# Patient Record
Sex: FEMALE | Race: WHITE | NOT HISPANIC OR LATINO | RURAL
[De-identification: names, ages, dates, MRNs, and addresses within clinical notes are randomized per-mention and may not be internally consistent; named-entity substitution may affect disease eponyms.]

---

## 2017-06-26 ENCOUNTER — CONVERSION ENCOUNTER (OUTPATIENT)
Dept: FAMILY MEDICINE CLINIC | Facility: CLINIC | Age: 22
End: 2017-06-26

## 2017-06-29 ENCOUNTER — CONVERSION ENCOUNTER (OUTPATIENT)
Dept: FAMILY MEDICINE CLINIC | Facility: CLINIC | Age: 22
End: 2017-06-29

## 2017-06-29 LAB — BACTERIA UR CULT: ABNORMAL

## 2018-01-04 ENCOUNTER — CONVERSION ENCOUNTER (OUTPATIENT)
Dept: FAMILY MEDICINE CLINIC | Facility: CLINIC | Age: 23
End: 2018-01-04

## 2018-11-19 ENCOUNTER — CONVERSION ENCOUNTER (OUTPATIENT)
Dept: FAMILY MEDICINE CLINIC | Facility: CLINIC | Age: 23
End: 2018-11-19

## 2019-01-16 ENCOUNTER — CONVERSION ENCOUNTER (OUTPATIENT)
Dept: FAMILY MEDICINE CLINIC | Facility: CLINIC | Age: 24
End: 2019-01-16

## 2019-03-13 ENCOUNTER — CONVERSION ENCOUNTER (OUTPATIENT)
Dept: FAMILY MEDICINE CLINIC | Facility: CLINIC | Age: 24
End: 2019-03-13

## 2019-06-04 VITALS
OXYGEN SATURATION: 97 % | OXYGEN SATURATION: 96 % | OXYGEN SATURATION: 98 % | DIASTOLIC BLOOD PRESSURE: 74 MMHG | WEIGHT: 150.2 LBS | BODY MASS INDEX: 19.63 KG/M2 | RESPIRATION RATE: 16 BRPM | OXYGEN SATURATION: 96 % | HEIGHT: 65 IN | WEIGHT: 117.8 LBS | HEART RATE: 105 BPM | HEIGHT: 65 IN | HEART RATE: 112 BPM | DIASTOLIC BLOOD PRESSURE: 60 MMHG | SYSTOLIC BLOOD PRESSURE: 109 MMHG | HEIGHT: 65 IN | BODY MASS INDEX: 22.86 KG/M2 | DIASTOLIC BLOOD PRESSURE: 75 MMHG | RESPIRATION RATE: 16 BRPM | HEIGHT: 65 IN | SYSTOLIC BLOOD PRESSURE: 106 MMHG | SYSTOLIC BLOOD PRESSURE: 94 MMHG | HEART RATE: 91 BPM | RESPIRATION RATE: 16 BRPM | DIASTOLIC BLOOD PRESSURE: 70 MMHG | HEART RATE: 88 BPM | WEIGHT: 117.8 LBS | BODY MASS INDEX: 25.02 KG/M2 | OXYGEN SATURATION: 97 % | RESPIRATION RATE: 18 BRPM | BODY MASS INDEX: 21.23 KG/M2 | SYSTOLIC BLOOD PRESSURE: 106 MMHG | WEIGHT: 127.4 LBS | WEIGHT: 137.2 LBS | HEART RATE: 95 BPM | RESPIRATION RATE: 16 BRPM | DIASTOLIC BLOOD PRESSURE: 65 MMHG | SYSTOLIC BLOOD PRESSURE: 93 MMHG

## 2019-08-16 ENCOUNTER — OFFICE VISIT (OUTPATIENT)
Dept: FAMILY MEDICINE CLINIC | Facility: CLINIC | Age: 24
End: 2019-08-16

## 2019-08-16 ENCOUNTER — TELEPHONE (OUTPATIENT)
Dept: FAMILY MEDICINE CLINIC | Facility: CLINIC | Age: 24
End: 2019-08-16

## 2019-08-16 VITALS
BODY MASS INDEX: 22.86 KG/M2 | HEIGHT: 65 IN | HEART RATE: 95 BPM | WEIGHT: 137.2 LBS | RESPIRATION RATE: 18 BRPM | DIASTOLIC BLOOD PRESSURE: 64 MMHG | TEMPERATURE: 98 F | OXYGEN SATURATION: 100 % | SYSTOLIC BLOOD PRESSURE: 91 MMHG

## 2019-08-16 DIAGNOSIS — H66.91 ACUTE OTITIS MEDIA, RIGHT: Primary | ICD-10-CM

## 2019-08-16 DIAGNOSIS — J01.00 ACUTE NON-RECURRENT MAXILLARY SINUSITIS: ICD-10-CM

## 2019-08-16 PROBLEM — J30.9 ALLERGIC RHINITIS: Status: ACTIVE | Noted: 2019-08-16

## 2019-08-16 PROCEDURE — 99213 OFFICE O/P EST LOW 20 MIN: CPT | Performed by: NURSE PRACTITIONER

## 2019-08-16 RX ORDER — AMOXICILLIN AND CLAVULANATE POTASSIUM 875; 125 MG/1; MG/1
1 TABLET, FILM COATED ORAL 2 TIMES DAILY
Qty: 20 TABLET | Refills: 0 | Status: SHIPPED | OUTPATIENT
Start: 2019-08-16 | End: 2020-02-19

## 2019-08-16 RX ORDER — FENUGREEK SEED/BL.THISTLE/ANIS 340 MG
CAPSULE ORAL
COMMUNITY
Start: 2019-01-16 | End: 2021-06-24

## 2019-08-16 RX ORDER — ESCITALOPRAM OXALATE 10 MG/1
10 TABLET ORAL DAILY
COMMUNITY
End: 2021-06-24

## 2019-08-16 NOTE — PROGRESS NOTES
Chief Complaint   Patient presents with   • URI     x 1 week   • Fever     102, took tylenol this morning.         Subjective     Gin Garcia  has a past medical history of Allergic rhinitis, Anisocoria, Dysmenorrhea, and Hypoglycemia.    URI    This is a new problem. The current episode started 1 to 4 weeks ago. The problem has been gradually worsening. The maximum temperature recorded prior to her arrival was 102 - 102.9 F. Associated symptoms include congestion, rhinorrhea and sinus pain. Pertinent negatives include no abdominal pain, chest pain, coughing, diarrhea, ear pain, nausea, neck pain, rash, sneezing, sore throat, swollen glands, vomiting or wheezing. She has tried acetaminophen for the symptoms. The treatment provided mild relief.       PHQ-2 Depression Screening  Little interest or pleasure in doing things? 0   Feeling down, depressed, or hopeless? 0   PHQ-2 Total Score 0       Allergies   Allergen Reactions   • Azithromycin Rash         Current Outpatient Medications:   •  escitalopram (LEXAPRO) 10 MG tablet, Take 10 mg by mouth Daily., Disp: , Rfl:   •  amoxicillin-clavulanate (AUGMENTIN) 875-125 MG per tablet, Take 1 tablet by mouth 2 (Two) Times a Day., Disp: 20 tablet, Rfl: 0  •  Hzqtio-Vhutrekrr-Tldr-Fish Oil (PRENATAL + COMPLETE MULTI) 0.267 & 373 MG therapy, PRENATAL VITAMINS TABS, Disp: , Rfl:     Past Medical History:   Diagnosis Date   • Allergic rhinitis    • Anisocoria    • Dysmenorrhea    • Hypoglycemia        History reviewed. No pertinent surgical history.    Social History     Socioeconomic History   • Marital status: Single     Spouse name: Not on file   • Number of children: Not on file   • Years of education: Not on file   • Highest education level: Not on file   Tobacco Use   • Smoking status: Never Smoker   • Smokeless tobacco: Never Used   Substance and Sexual Activity   • Alcohol use: No     Frequency: Never   • Drug use: No       Family History   Problem Relation Age of  "Onset   • Depression Father    • Brain cancer Maternal Uncle    • Diabetes Maternal Grandmother    • Hypertension Maternal Grandmother    • Diabetes Paternal Grandfather        Family history, surgical history, past medical history, Allergies and med's reviewed with patient today and updated in [facility] EMR.     ROS:  Review of Systems   Constitutional: Positive for appetite change, chills, fatigue and fever. Negative for diaphoresis.   HENT: Positive for congestion, postnasal drip, rhinorrhea and sinus pressure. Negative for ear discharge, ear pain, facial swelling, hearing loss, mouth sores, nosebleeds, sneezing, sore throat, swollen glands, trouble swallowing and voice change.    Eyes: Negative for discharge, redness and itching.   Respiratory: Negative for cough, chest tightness, shortness of breath and wheezing.    Cardiovascular: Negative for chest pain and palpitations.   Gastrointestinal: Negative for abdominal pain, diarrhea, nausea and vomiting.   Musculoskeletal: Positive for myalgias. Negative for neck pain and neck stiffness.   Skin: Negative for rash.   Allergic/Immunologic: Negative for environmental allergies.   Neurological: Positive for headache. Negative for dizziness.   Hematological: Negative for adenopathy.       OBJECTIVE:  Vitals:    08/16/19 1026   BP: 91/64   BP Location: Left arm   Patient Position: Sitting   Cuff Size: Adult   Pulse: 95   Resp: 18   Temp: 98 °F (36.7 °C)   TempSrc: Oral   SpO2: 100%   Weight: 62.2 kg (137 lb 3.2 oz)   Height: 165.1 cm (65\")     Body mass index is 22.83 kg/m².  LMP Bleeding almost every day since May (Had Nexplanon removed last Friday), stopped bleeding a few days ago.     Physical Exam   Constitutional: She is oriented to person, place, and time. She appears well-developed and well-nourished. She is active. No distress.   HENT:   Head: Normocephalic and atraumatic.   Right Ear: External ear and ear canal normal. No drainage. Tympanic membrane is injected and " erythematous. Tympanic membrane is not retracted. A middle ear effusion is present.   Left Ear: External ear and ear canal normal. No drainage. Tympanic membrane is not injected, not erythematous and not retracted.   Nose: Mucosal edema and rhinorrhea present. Right sinus exhibits maxillary sinus tenderness. Right sinus exhibits no frontal sinus tenderness. Left sinus exhibits maxillary sinus tenderness. Left sinus exhibits no frontal sinus tenderness.   Mouth/Throat: Uvula is midline, oropharynx is clear and moist and mucous membranes are normal. No oral lesions. No oropharyngeal exudate, posterior oropharyngeal edema or posterior oropharyngeal erythema.   Eyes: Conjunctivae, EOM and lids are normal. Pupils are equal, round, and reactive to light. Right eye exhibits no discharge. Left eye exhibits no discharge.   Neck: Normal range of motion. Neck supple. No tracheal deviation present. No thyromegaly present.   Cardiovascular: Normal rate, regular rhythm, normal heart sounds and intact distal pulses. Exam reveals no gallop and no friction rub.   No murmur heard.  Pulmonary/Chest: Effort normal and breath sounds normal. No respiratory distress. She has no wheezes. She has no rales.   Abdominal: Soft. Bowel sounds are normal. There is no hepatosplenomegaly. There is no tenderness. No hernia.   Musculoskeletal: Normal range of motion. She exhibits no edema or deformity.   Lymphadenopathy:        Head (right side): Tonsillar adenopathy present.        Head (left side): Tonsillar adenopathy present.   Neurological: She is alert and oriented to person, place, and time.   Skin: Skin is warm and dry. No lesion and no rash noted. She is not diaphoretic.   Psychiatric: She has a normal mood and affect. Her behavior is normal.     ASSESSMENT/ PLAN:    Diagnoses and all orders for this visit:    1. Acute otitis media, right (Primary)    2. Acute non-recurrent maxillary sinusitis    Other orders  -     amoxicillin-clavulanate  (AUGMENTIN) 875-125 MG per tablet; Take 1 tablet by mouth 2 (Two) Times a Day.  Dispense: 20 tablet; Refill: 0    Increase fluid intake. Tylenol or Advil PRN. Discussed use of OTC cold/cough medications. Discussed anticipated course. Return if not improving over the next week, sooner for worsening.     Orders Placed Today:     New Medications Ordered This Visit   Medications   • amoxicillin-clavulanate (AUGMENTIN) 875-125 MG per tablet     Sig: Take 1 tablet by mouth 2 (Two) Times a Day.     Dispense:  20 tablet     Refill:  0        Management Plan:     An After Visit Summary was printed and given to the patient at discharge.    Follow-up: No Follow-up on file.    Joselyn White, BRYANT 8/18/2019 11:32 AM  This note was electronically signed.

## 2019-08-16 NOTE — PATIENT INSTRUCTIONS
Probiotics  Probiotics are the good bacteria and yeasts that live in your body and keep your digestive system healthy. Probiotics also help your body's defense system (immunesystem) and protect your body against the growth of harmful bacteria. Your health care provider may recommend taking a probiotic if you are taking antibiotics or have certain medical conditions, such as:  · Diarrhea.  · Constipation.  · Irritable bowel syndrome.  · Lung infections.  · Yeast infections.  · Acne, eczema, and other skin conditions.  · Frequent urinary tract infections.  What affects the balance of bacteria in my body?  The balance of good bacteria in your body can be affected by:  · Antibiotic medicines. These medicines treat infections caused by bacteria. Unfortunately, they may kill the good bacteria in your body as well as the bad bacteria.  · Certain medical conditions. Conditions related to an imbalance of bacteria include:  ? Stomach and intestine (gastrointestinal) infections.  ? Lung infections.  ? Skin infections.  ? Vaginal infections.  ? Inflammatory bowel diseases.  ? Stomach ulcers (gastric ulcers).  ? Tooth decay and gum disease (periodontal disease).  · Stress.   · Poor diet.  What type of probiotic is right for me?  Probiotics contain different types of bacteria (strains). Strains commonly found in probiotics include:  · Lactobacillus.  · Saccharomyces.  · Bifidobacterium.  Specific strains have been shown to be more effective for certain health conditions. Ask your health care provider which strain or strains you should use and how often.  Probiotics come in many different forms, strain combinations, and strengths. Some may need to be refrigerated. Always read the label for storage and usage instructions.  Certain foods, such as yogurt, contain probiotics. Probiotics can also be bought as a supplement at a pharmacy, health food store, or grocery store. Talk to your health care provider before starting any  supplement.  What are the side effects of probiotics?  Some people have side effects when taking probiotics. Side effects are usually temporary and may include:  · Gas.  · Bloating.  · Cramping.  Serious side effects are rare.  Follow these instructions at home:    · If you are taking probiotics with antibiotics:  ? Wait at least 2 hours between taking your medicine and the probiotic.  ? Eat foods high in fiber, such as whole grains, beans, and vegetables. These foods can help good bacteria grow.  ? Avoid certain foods as told by your health care provider.  Summary  · Probiotics are the good bacteria and yeasts that live in your body and keep you and your digestive system healthy.  · Certain foods, such as yogurt, contain probiotics.  · Probiotics can be taken as supplements. They can be bought at a pharmacy, health food store, or grocery store. They come in many different forms, strain combinations, and strengths.  · Be sure to talk with your health care provider before taking a probiotic supplement.  This information is not intended to replace advice given to you by your health care provider. Make sure you discuss any questions you have with your health care provider.  Document Released: 07/15/2015 Document Revised: 01/02/2019 Document Reviewed: 01/02/2019  [employer] Patient Education © 2019 [employer]

## 2019-08-16 NOTE — TELEPHONE ENCOUNTER
Patient says shes had URI symptoms for 1 week. Woke up with Fever, body ache, and headaches this morning. Wanting to be seen. No openings. Please advise.

## 2020-02-19 ENCOUNTER — OFFICE VISIT (OUTPATIENT)
Dept: FAMILY MEDICINE CLINIC | Facility: CLINIC | Age: 25
End: 2020-02-19

## 2020-02-19 VITALS
SYSTOLIC BLOOD PRESSURE: 107 MMHG | BODY MASS INDEX: 23.49 KG/M2 | HEART RATE: 120 BPM | RESPIRATION RATE: 16 BRPM | TEMPERATURE: 98.2 F | HEIGHT: 65 IN | WEIGHT: 141 LBS | OXYGEN SATURATION: 96 % | DIASTOLIC BLOOD PRESSURE: 74 MMHG

## 2020-02-19 DIAGNOSIS — R05.9 COUGH: ICD-10-CM

## 2020-02-19 DIAGNOSIS — H65.03 NON-RECURRENT ACUTE SEROUS OTITIS MEDIA OF BOTH EARS: ICD-10-CM

## 2020-02-19 DIAGNOSIS — J11.1 FLU: Primary | ICD-10-CM

## 2020-02-19 LAB
EXPIRATION DATE: ABNORMAL
FLUAV AG NPH QL: POSITIVE
FLUBV AG NPH QL: NEGATIVE
INTERNAL CONTROL: ABNORMAL
Lab: ABNORMAL

## 2020-02-19 PROCEDURE — 99213 OFFICE O/P EST LOW 20 MIN: CPT | Performed by: NURSE PRACTITIONER

## 2020-02-19 PROCEDURE — 87804 INFLUENZA ASSAY W/OPTIC: CPT | Performed by: NURSE PRACTITIONER

## 2020-02-19 RX ORDER — OSELTAMIVIR PHOSPHATE 75 MG/1
75 CAPSULE ORAL 2 TIMES DAILY
Qty: 10 CAPSULE | Refills: 0 | Status: SHIPPED | OUTPATIENT
Start: 2020-02-19 | End: 2023-02-27

## 2020-02-19 RX ORDER — AMOXICILLIN 500 MG/1
500 CAPSULE ORAL 3 TIMES DAILY
Qty: 30 CAPSULE | Refills: 0 | Status: SHIPPED | OUTPATIENT
Start: 2020-02-19 | End: 2021-06-24

## 2020-02-19 NOTE — PROGRESS NOTES
Chief Complaint   Patient presents with   • Cough   • Headache        Subjective   Gin Garcia is a 24 y.o. female who presents today for cough, fever and earache    HPI: Patient's daughter has had the flu for 2 days.  She started with a cough and body aches yesterday.  She had a fever of 101 last night.  She has bilateral ear pain as well as a sore throat.  She is taking over-the-counter medications which are somewhat helpful for the symptoms.  However her body aches and fever are progressive.    Gin Garcia  has a past medical history of Allergic rhinitis, Anisocoria, Dysmenorrhea, and Hypoglycemia.    Allergies   Allergen Reactions   • Azithromycin Rash       Current Outpatient Medications:   •  amoxicillin (AMOXIL) 500 MG capsule, Take 1 capsule by mouth 3 (Three) Times a Day., Disp: 30 capsule, Rfl: 0  •  escitalopram (LEXAPRO) 10 MG tablet, Take 10 mg by mouth Daily., Disp: , Rfl:   •  oseltamivir (TAMIFLU) 75 MG capsule, Take 1 capsule by mouth 2 (Two) Times a Day for 5 days., Disp: 10 capsule, Rfl: 0  •  Azqche-Akqzmdngu-Yhib-Fish Oil (PRENATAL + COMPLETE MULTI) 0.267 & 373 MG therapy, PRENATAL VITAMINS TABS, Disp: , Rfl:   Past Medical History:   Diagnosis Date   • Allergic rhinitis    • Anisocoria    • Dysmenorrhea    • Hypoglycemia      Past Surgical History:   Procedure Laterality Date   •  SECTION       Social History     Socioeconomic History   • Marital status: Single     Spouse name: Not on file   • Number of children: Not on file   • Years of education: Not on file   • Highest education level: Not on file   Tobacco Use   • Smoking status: Never Smoker   • Smokeless tobacco: Never Used   Substance and Sexual Activity   • Alcohol use: No     Frequency: Never   • Drug use: No     Family History   Problem Relation Age of Onset   • Depression Father    • Brain cancer Maternal Uncle    • Diabetes Maternal Grandmother    • Hypertension Maternal Grandmother    • Diabetes Paternal  "Grandfather        Family history, surgical history, past medical history, Allergies and med's reviewed with patient today and updated in [facility] EMR.   PHQ-2 Depression Screening  Little interest or pleasure in doing things? 0   Feeling down, depressed, or hopeless? 0   PHQ-2 Total Score 0   PHQ-9 Depression Screening  Little interest or pleasure in doing things? 0   Feeling down, depressed, or hopeless? 0   Trouble falling or staying asleep, or sleeping too much?     Feeling tired or having little energy?     Poor appetite or overeating?     Feeling bad about yourself - or that you are a failure or have let yourself or your family down?     Trouble concentrating on things, such as reading the newspaper or watching television?     Moving or speaking so slowly that other people could have noticed? Or the opposite - being so fidgety or restless that you have been moving around a lot more than usual?     Thoughts that you would be better off dead, or of hurting yourself in some way?     PHQ-9 Total Score 0   If you checked off any problems, how difficult have these problems made it for you to do your work, take care of things at home, or get along with other people?       ROS:  Review of Systems   Constitutional: Positive for fatigue and fever.   HENT: Positive for ear pain. Negative for postnasal drip and sinus pressure.    Eyes: Negative for visual disturbance.   Respiratory: Positive for cough. Negative for shortness of breath.    Cardiovascular: Negative for chest pain and palpitations.   Musculoskeletal: Positive for myalgias.       OBJECTIVE:  Vitals:    02/19/20 0930   BP: 107/74   BP Location: Left arm   Patient Position: Sitting   Cuff Size: Adult   Pulse: 120   Resp: 16   Temp: 98.2 °F (36.8 °C)   TempSrc: Oral   SpO2: 96%   Weight: 64 kg (141 lb)   Height: 165.1 cm (65\")     Physical Exam   Constitutional: Vital signs are normal. She appears well-developed.   HENT:   Head: Normocephalic and atraumatic.   Right " Ear: External ear and ear canal normal. Tympanic membrane is erythematous.   Left Ear: External ear and ear canal normal. Tympanic membrane is erythematous.   Nose: Nose normal.   Mouth/Throat: Uvula is midline, oropharynx is clear and moist and mucous membranes are normal.   Postnasal discharge   Eyes: Pupils are equal, round, and reactive to light. Conjunctivae are normal.   Neck: Normal range of motion. Neck supple.   Cardiovascular: Regular rhythm and normal heart sounds. Exam reveals no gallop and no friction rub.   No murmur heard.  Pulmonary/Chest: Effort normal and breath sounds normal. She has no wheezes. She has no rhonchi.   Abdominal: Soft. Bowel sounds are normal. She exhibits no distension. There is no hepatosplenomegaly. There is no tenderness.   Skin: Skin is warm and dry.   Nursing note and vitals reviewed.      ASSESSMENT/ PLAN:    Gin was seen today for cough and headache.    Diagnoses and all orders for this visit:    Flu  Comments:  Discussed flu and over-the-counter medications for treatment.  Elected to take Tamiflu    Cough  -     POC Influenza A / B    Non-recurrent acute serous otitis media of both ears  Comments:  Discussed ear infection    Other orders  -     amoxicillin (AMOXIL) 500 MG capsule; Take 1 capsule by mouth 3 (Three) Times a Day.  -     oseltamivir (TAMIFLU) 75 MG capsule; Take 1 capsule by mouth 2 (Two) Times a Day for 5 days.        Orders Placed Today:     New Medications Ordered This Visit   Medications   • amoxicillin (AMOXIL) 500 MG capsule     Sig: Take 1 capsule by mouth 3 (Three) Times a Day.     Dispense:  30 capsule     Refill:  0   • oseltamivir (TAMIFLU) 75 MG capsule     Sig: Take 1 capsule by mouth 2 (Two) Times a Day for 5 days.     Dispense:  10 capsule     Refill:  0        Management Plan:     An After Visit Summary was printed and given to the patient at discharge.    Follow-up: Return if symptoms worsen or fail to improve.    BRYANT Evans  2/19/2020 9:46 AM  This note was electronically signed.

## 2020-03-23 ENCOUNTER — TELEPHONE (OUTPATIENT)
Dept: FAMILY MEDICINE CLINIC | Facility: CLINIC | Age: 25
End: 2020-03-23

## 2020-03-23 RX ORDER — CIPROFLOXACIN 500 MG/1
500 TABLET, FILM COATED ORAL 2 TIMES DAILY
Qty: 20 TABLET | Refills: 0 | Status: SHIPPED | OUTPATIENT
Start: 2020-03-23 | End: 2021-06-24

## 2020-03-23 NOTE — TELEPHONE ENCOUNTER
I will send in an antibiotic that would cover some types of colitis.  As long as she is not bleeding more than normal.  I would not worry too much about it.  If it keeps up we will need to do a blood count and probably take a look.

## 2020-03-23 NOTE — TELEPHONE ENCOUNTER
Patients mother called and said her daughter was having a fever, diarrhea and bloody mucous showing on the toilet paper when she wipes. Adela and the patients daughter both had diarrhea and fever last week. Adela is concerned about the blood and is wanting to know what to do. Please advise?

## 2021-06-24 ENCOUNTER — OFFICE VISIT (OUTPATIENT)
Dept: FAMILY MEDICINE CLINIC | Facility: CLINIC | Age: 26
End: 2021-06-24

## 2021-06-24 VITALS
SYSTOLIC BLOOD PRESSURE: 95 MMHG | HEIGHT: 65 IN | TEMPERATURE: 98.2 F | HEART RATE: 93 BPM | WEIGHT: 131 LBS | BODY MASS INDEX: 21.83 KG/M2 | DIASTOLIC BLOOD PRESSURE: 64 MMHG | OXYGEN SATURATION: 98 % | RESPIRATION RATE: 16 BRPM

## 2021-06-24 DIAGNOSIS — F41.9 ANXIETY: ICD-10-CM

## 2021-06-24 DIAGNOSIS — R68.89 SOMATIC COMPLAINTS, MULTIPLE: ICD-10-CM

## 2021-06-24 DIAGNOSIS — R53.83 FATIGUE, UNSPECIFIED TYPE: Primary | ICD-10-CM

## 2021-06-24 PROBLEM — K59.09 CHRONIC CONSTIPATION: Status: ACTIVE | Noted: 2021-06-24

## 2021-06-24 PROCEDURE — 99213 OFFICE O/P EST LOW 20 MIN: CPT | Performed by: NURSE PRACTITIONER

## 2021-06-24 NOTE — PROGRESS NOTES
"Chief Complaint  Fatigue (slow hair growth, dry skin, concerned with thyroid)    Subjective          History of Present Illness  Patient presents today with multiple complaints.  Her hair is getting thinner and is growing at a slower rate than he used to.  She is having fatigue, brain fog, dry skin, and has chronic constipation. No palpitations, chest pain, shortness of breath.  She always feels cold.  Her anxiety has been increasing.  She was on Lexapro briefly in the past, but stopped taking it.      No current outpatient medications on file.     Review of Systems   Constitutional: Negative for unexpected weight gain and unexpected weight loss.   HENT: Negative for trouble swallowing and voice change.    Respiratory: Negative for cough.    Cardiovascular: Negative for leg swelling.   Gastrointestinal: Negative for abdominal pain, diarrhea, nausea and vomiting.   Genitourinary: Negative for amenorrhea, dysuria, frequency and menstrual problem.   Neurological: Negative for dizziness, syncope and headache.   Hematological: Negative for adenopathy.   Psychiatric/Behavioral: Negative for self-injury and suicidal ideas.        Objective   Vital Signs:   Vitals:    06/24/21 1420   BP: 95/64   BP Location: Left arm   Patient Position: Sitting   Cuff Size: Adult   Pulse: 93   Resp: 16   Temp: 98.2 °F (36.8 °C)   SpO2: 98%   Weight: 59.4 kg (131 lb)   Height: 165.1 cm (65\")     Body mass index is 21.8 kg/m².  Patient's last menstrual period was 05/27/2021.     Physical Exam  Constitutional:       General: She is not in acute distress.     Appearance: She is well-developed. She is not diaphoretic.   HENT:      Head: Normocephalic and atraumatic.      Right Ear: Ear canal and external ear normal. No drainage. Tympanic membrane is not injected, erythematous or retracted.      Left Ear: Ear canal and external ear normal. No drainage. Tympanic membrane is not injected, erythematous or retracted.      Nose: Nose normal. No " mucosal edema or rhinorrhea.      Right Sinus: No maxillary sinus tenderness or frontal sinus tenderness.      Left Sinus: No maxillary sinus tenderness or frontal sinus tenderness.      Mouth/Throat:      Mouth: No oral lesions.      Pharynx: Uvula midline. No oropharyngeal exudate or posterior oropharyngeal erythema.   Eyes:      General: Lids are normal.         Right eye: No discharge.         Left eye: No discharge.      Conjunctiva/sclera: Conjunctivae normal.   Neck:      Thyroid: No thyromegaly.      Vascular: No carotid bruit.      Trachea: Trachea normal. No tracheal deviation.   Cardiovascular:      Rate and Rhythm: Normal rate and regular rhythm.      Heart sounds: Normal heart sounds. No murmur heard.   No friction rub. No gallop.    Pulmonary:      Effort: Pulmonary effort is normal. No respiratory distress.      Breath sounds: Normal breath sounds. No wheezing or rales.   Abdominal:      General: Bowel sounds are normal.      Palpations: Abdomen is soft.      Tenderness: There is no abdominal tenderness.      Hernia: No hernia is present.   Musculoskeletal:         General: No deformity. Normal range of motion.      Cervical back: Normal range of motion and neck supple.   Lymphadenopathy:      Cervical: No cervical adenopathy.   Skin:     General: Skin is warm and dry.      Findings: No lesion or rash.   Neurological:      Mental Status: She is alert and oriented to person, place, and time.   Psychiatric:         Behavior: Behavior normal.         Thought Content: Thought content normal.         Judgment: Judgment normal.        Result Review :   The following data was reviewed by: BRYANT Brumfield on 06/24/2021:  Lab Results   Component Value Date    WBC 8.8 03/22/2019    HGB 8.9 RESULT CHECKED (L) 03/22/2019    HCT 26.9 (L) 03/22/2019    MCV 89.4 03/22/2019     03/22/2019     Lab Results   Component Value Date    GLUCOSE 79 10/01/2018    BUN 9 10/01/2018    CREATININE 0.6 10/01/2018    BCR  15.0 10/01/2018    K 3.4 (L) 10/01/2018    CO2 21 (L) 10/01/2018    CALCIUM 9.3 10/01/2018     Assessment and Plan    Diagnoses and all orders for this visit:    1. Fatigue, unspecified type (Primary)  Assessment & Plan:  Will call with lab results and further recommendations.     Orders:  -     CBC & Differential  -     Comprehensive Metabolic Panel  -     TSH    2. Somatic complaints, multiple    3. Anxiety  Assessment & Plan:  Discussed with patient that this could be contributing to some of her symptoms.   If labs are unremarkable will discuss addressing anxiety further.             Follow Up   No follow-ups on file.    Patient was given instructions and counseling regarding her condition and health maintenance advice. Please see specific information in the After Visit Summary.     Joselyn White, APRALONZO 6/24/2021 16:12 EDT  This note was electronically signed.

## 2021-06-24 NOTE — ASSESSMENT & PLAN NOTE
Discussed with patient that this could be contributing to some of her symptoms.   If labs are unremarkable will discuss addressing anxiety further.

## 2021-06-25 LAB
ALBUMIN SERPL-MCNC: 4.5 G/DL (ref 3.9–5)
ALBUMIN/GLOB SERPL: 1.3 {RATIO} (ref 1.2–2.2)
ALP SERPL-CCNC: 51 IU/L (ref 48–121)
ALT SERPL-CCNC: 18 IU/L (ref 0–32)
AST SERPL-CCNC: 20 IU/L (ref 0–40)
BASOPHILS # BLD AUTO: 0 X10E3/UL (ref 0–0.2)
BASOPHILS NFR BLD AUTO: 1 %
BILIRUB SERPL-MCNC: 0.5 MG/DL (ref 0–1.2)
BUN SERPL-MCNC: 12 MG/DL (ref 6–20)
BUN/CREAT SERPL: 16 (ref 9–23)
CALCIUM SERPL-MCNC: 9.7 MG/DL (ref 8.7–10.2)
CHLORIDE SERPL-SCNC: 103 MMOL/L (ref 96–106)
CO2 SERPL-SCNC: 24 MMOL/L (ref 20–29)
CREAT SERPL-MCNC: 0.77 MG/DL (ref 0.57–1)
EOSINOPHIL # BLD AUTO: 0.2 X10E3/UL (ref 0–0.4)
EOSINOPHIL NFR BLD AUTO: 3 %
ERYTHROCYTE [DISTWIDTH] IN BLOOD BY AUTOMATED COUNT: 11.8 % (ref 11.7–15.4)
GLOBULIN SER CALC-MCNC: 3.6 G/DL (ref 1.5–4.5)
GLUCOSE SERPL-MCNC: 100 MG/DL (ref 65–99)
HCT VFR BLD AUTO: 38.8 % (ref 34–46.6)
HGB BLD-MCNC: 13.4 G/DL (ref 11.1–15.9)
IMM GRANULOCYTES # BLD AUTO: 0 X10E3/UL (ref 0–0.1)
IMM GRANULOCYTES NFR BLD AUTO: 0 %
LYMPHOCYTES # BLD AUTO: 2.2 X10E3/UL (ref 0.7–3.1)
LYMPHOCYTES NFR BLD AUTO: 28 %
MCH RBC QN AUTO: 31.6 PG (ref 26.6–33)
MCHC RBC AUTO-ENTMCNC: 34.5 G/DL (ref 31.5–35.7)
MCV RBC AUTO: 92 FL (ref 79–97)
MONOCYTES # BLD AUTO: 0.4 X10E3/UL (ref 0.1–0.9)
MONOCYTES NFR BLD AUTO: 5 %
NEUTROPHILS # BLD AUTO: 4.9 X10E3/UL (ref 1.4–7)
NEUTROPHILS NFR BLD AUTO: 63 %
PLATELET # BLD AUTO: 313 X10E3/UL (ref 150–450)
POTASSIUM SERPL-SCNC: 4.6 MMOL/L (ref 3.5–5.2)
PROT SERPL-MCNC: 8.1 G/DL (ref 6–8.5)
RBC # BLD AUTO: 4.24 X10E6/UL (ref 3.77–5.28)
SODIUM SERPL-SCNC: 139 MMOL/L (ref 134–144)
TSH SERPL DL<=0.005 MIU/L-ACNC: 1.59 UIU/ML (ref 0.45–4.5)
WBC # BLD AUTO: 7.7 X10E3/UL (ref 3.4–10.8)

## 2023-02-27 ENCOUNTER — OFFICE VISIT (OUTPATIENT)
Dept: FAMILY MEDICINE CLINIC | Facility: CLINIC | Age: 28
End: 2023-02-27
Payer: COMMERCIAL

## 2023-02-27 VITALS
BODY MASS INDEX: 25.16 KG/M2 | HEART RATE: 90 BPM | RESPIRATION RATE: 16 BRPM | TEMPERATURE: 98 F | SYSTOLIC BLOOD PRESSURE: 106 MMHG | HEIGHT: 65 IN | OXYGEN SATURATION: 98 % | DIASTOLIC BLOOD PRESSURE: 70 MMHG | WEIGHT: 151 LBS

## 2023-02-27 DIAGNOSIS — F41.9 ANXIETY: Primary | ICD-10-CM

## 2023-02-27 DIAGNOSIS — R53.83 FATIGUE, UNSPECIFIED TYPE: ICD-10-CM

## 2023-02-27 DIAGNOSIS — J30.2 SEASONAL ALLERGIES: ICD-10-CM

## 2023-02-27 DIAGNOSIS — Z13.29 SCREENING FOR THYROID DISORDER: ICD-10-CM

## 2023-02-27 DIAGNOSIS — Z13.1 SCREENING FOR DIABETES MELLITUS: ICD-10-CM

## 2023-02-27 DIAGNOSIS — R10.31 RIGHT LOWER QUADRANT PAIN: ICD-10-CM

## 2023-02-27 DIAGNOSIS — K90.0 TRANSIENT GLUTEN SENSITIVITY: ICD-10-CM

## 2023-02-27 DIAGNOSIS — R14.0 ABDOMINAL BLOATING: ICD-10-CM

## 2023-02-27 DIAGNOSIS — Z13.220 SCREENING FOR LIPID DISORDERS: ICD-10-CM

## 2023-02-27 LAB
BILIRUB BLD-MCNC: NEGATIVE MG/DL
CLARITY, POC: CLEAR
COLOR UR: YELLOW
GLUCOSE UR STRIP-MCNC: NEGATIVE MG/DL
KETONES UR QL: NEGATIVE
LEUKOCYTE EST, POC: ABNORMAL
NITRITE UR-MCNC: NEGATIVE MG/ML
PH UR: 5.5 [PH] (ref 5–8)
PROT UR STRIP-MCNC: NEGATIVE MG/DL
RBC # UR STRIP: NEGATIVE /UL
SP GR UR: 1.02 (ref 1–1.03)
UROBILINOGEN UR QL: NORMAL

## 2023-02-27 PROCEDURE — 99214 OFFICE O/P EST MOD 30 MIN: CPT

## 2023-02-27 PROCEDURE — 81002 URINALYSIS NONAUTO W/O SCOPE: CPT

## 2023-02-27 RX ORDER — HYDROXYZINE HYDROCHLORIDE 25 MG/1
25 TABLET, FILM COATED ORAL NIGHTLY PRN
Qty: 30 TABLET | Refills: 2 | Status: SHIPPED | OUTPATIENT
Start: 2023-02-27

## 2023-02-27 RX ORDER — CETIRIZINE HYDROCHLORIDE 10 MG/1
10 TABLET ORAL DAILY
COMMUNITY

## 2023-02-27 RX ORDER — BUPROPION HYDROCHLORIDE 150 MG/1
150 TABLET ORAL DAILY
Qty: 30 TABLET | Refills: 3 | Status: SHIPPED | OUTPATIENT
Start: 2023-02-27

## 2023-02-27 NOTE — PATIENT INSTRUCTIONS
Make appt with ob/gyn for well woman exam.     Take certizine 10mg daily, nasacort daily for seasonal allergies.    Begin a food diary.  Lose it tyesha, [employer] tyesha.  Good sleep hygiene          Please have blood work completed.  Orders are at [facility].    Return for annual physical exam.  Continue plan of care as discussed.     Practice good sleep hygiene.  Eat a well balanced diet with fresh fruit and vegetables.    Drink at least 8 bottles of water or equivalent per day.     Limit sweetened beverages, sodas, juices.    Bake, boil, broil or grill your food, avoid eating fried foods.   Exercise at least 150 minutes per week.

## 2023-02-27 NOTE — PROGRESS NOTES
Subjective   Gin Garcia is a 27 y.o. female. Presents to [facility] MEDICAL [facility]        Chief Complaint   Patient presents with   • Bloated   • Abdominal Pain   • Establish Care       History of Present Illness  Patient presents for medication refills and management of the below listed conditions.  She was previously under the care of BRYANT Clarke.        Patient currently complains of a history of constipation which has since resolved. She reports a sensation of persistent abdominal bloating that occurs when she eats foods or anything else besides water. She reports no previous workup for this condition.  She reports this has been occurring in intensity the last year. She reports her stress level is improving but that 2020 and 2021 were hard levels. She does report that her stress level is getting better.      Encouraged patient to begin a food diary detailing daily intake of food and liquid, stress level, exercise activity, sleep, reproductive cycle, bowel movements and mood.     Preventive Care:  Routine dental exams are completed  - her dentist retired, she is working on a new one.   Ophthalmology exam completed by [facility]. She should wear glasses.  Last exam two or three years ago.   Well woman exams completed by Dr. Vazquez.   Last Pap smear - She has two children ages six and four. Has had a Pap.  Unsure of last test. No abnormals. No history of STD    Anxiety/Depression: The patient has a history of PPD with [facility], Discontinued Lexapro due to adverse side effects.  She does struggle with anxiety.  She reports a concern of stress. H/O thyroid testing - all normal.  She is a /biter when stressed out.  She agrees to begin wellbutrin for depression and hydroxyzine for anxiety and sleep assistance.  She will return in the near future for follow up and annual physical exam.       Healthy Habits:  Diet: She has been working on being healthier.  She has cut out red meat.   She eats chicken and vegetables. She reports abdominal bloat with starchy foods.   Exercise: She does exercise regularly.  She wears her seatlbelt regularly.   She sleeps approximately six to seven hours per night on a good night. She averages between four to six.  She does not have sleep apnea but she does snore. She is in a relationship.     Family History of cancers and/or conditions:   Breast cancer, cervical cancer, colon cancer screening and lung cancer screenings were discussed.    Liver cancer, pancreatic cancer, antonella cancer.     Care Gaps were addressed with patient today.  Routine wellness and immunizations were discussed.    Patient refuses immunizations at this time.       Recent lab results if available were discussed as well as medications reconciled.  Patient agrees with plan of care and will return as needed.  All questions answered patient agrees with plan of care.  Patient agrees to return in the near future for annual physical exam.       Review of Systems:  Review of Systems   Constitutional: Positive for fatigue and unexpected weight gain. Negative for activity change, appetite change, chills, fever and unexpected weight loss.   HENT: Positive for congestion. Negative for ear discharge, ear pain, swollen glands, tinnitus, trouble swallowing and voice change.    Eyes: Positive for visual disturbance (glasses).   Respiratory: Positive for cough. Negative for shortness of breath and wheezing.    Cardiovascular: Negative for chest pain, palpitations and leg swelling.   Gastrointestinal: Positive for abdominal distention, abdominal pain, nausea and indigestion. Negative for anal bleeding, blood in stool, constipation, diarrhea, vomiting and GERD.   Endocrine: Negative for polydipsia, polyphagia and polyuria.   Genitourinary: Negative for breast discharge, breast lump, breast pain, decreased libido, difficulty urinating, dysuria, flank pain, frequency, vaginal bleeding, vaginal discharge and vaginal  pain.        LMP 2/5/2023 lasts 4-6 days. Not on oral contraceptives.    Musculoskeletal: Negative.    Skin: Negative.    Allergic/Immunologic: Positive for environmental allergies. Negative for food allergies.   Neurological: Negative for dizziness, seizures, weakness, headache and confusion.   Hematological: Does not bruise/bleed easily.   Psychiatric/Behavioral: Positive for decreased concentration, sleep disturbance and stress. Negative for agitation, behavioral problems, self-injury and suicidal ideas. The patient is nervous/anxious.          I personally reviewed and updated the patient's allergies, medications, problem list, and past medical, surgical, social, and family history. I have reviewed and confirmed the accuracy of the History of Present Illness and Review of Symptoms as documented by the MA/LPN/RN. BRYANT Walter     Allergies:  Allergies   Allergen Reactions   • Azithromycin Rash       Social History:  Social History     Socioeconomic History   • Marital status: Single   Tobacco Use   • Smoking status: Never   • Smokeless tobacco: Never   Vaping Use   • Vaping Use: Never used   Substance and Sexual Activity   • Alcohol use: Yes     Comment: Socially   • Drug use: No   • Sexual activity: Yes     Partners: Male       Family History:  Family History   Problem Relation Age of Onset   • Depression Father    • Brain cancer Maternal Uncle    • Diabetes Maternal Grandmother    • Hypertension Maternal Grandmother    • Diabetes Paternal Grandfather    • Thyroid disease Sister        Past Medical History :  Patient Active Problem List   Diagnosis   • Allergic rhinitis   • Anisocoria   • Dysmenorrhea   • Hypoglycemia   • Anxiety   • Fatigue   • Somatic complaints, multiple   • Chronic constipation       Medication List:    Current Outpatient Medications:   •  cetirizine (zyrTEC) 10 MG tablet, Take 10 mg by mouth Daily., Disp: , Rfl:   •  buPROPion XL (Wellbutrin XL) 150 MG 24 hr tablet, Take 1 tablet  by mouth Daily., Disp: 30 tablet, Rfl: 3  •  hydrOXYzine (ATARAX) 25 MG tablet, Take 1 tablet by mouth At Night As Needed for Anxiety., Disp: 30 tablet, Rfl: 2    Past Surgical History:  Past Surgical History:   Procedure Laterality Date   •  SECTION           Physical Exam:  Physical Exam  Vitals and nursing note reviewed.   Constitutional:       General: She is not in acute distress.     Appearance: Normal appearance. She is well-groomed and normal weight. She is not ill-appearing, toxic-appearing or diaphoretic.   HENT:      Head: Normocephalic.   Neck:      Vascular: No carotid bruit.   Cardiovascular:      Rate and Rhythm: Normal rate and regular rhythm.      Pulses: Normal pulses.      Heart sounds: Normal heart sounds.   Pulmonary:      Effort: Pulmonary effort is normal.      Breath sounds: Normal breath sounds.   Abdominal:      General: Bowel sounds are normal. There is no distension or abdominal bruit. There are no signs of injury.      Palpations: Abdomen is soft. There is no shifting dullness, fluid wave, hepatomegaly, splenomegaly, mass or pulsatile mass.      Tenderness: There is abdominal tenderness. There is no right CVA tenderness, left CVA tenderness, guarding or rebound.      Hernia: No hernia is present.       Musculoskeletal:      Right lower leg: No edema.      Left lower leg: No edema.   Skin:     General: Skin is warm and dry.      Capillary Refill: Capillary refill takes less than 2 seconds.   Neurological:      Mental Status: She is alert and oriented to person, place, and time. Mental status is at baseline.   Psychiatric:         Attention and Perception: Attention normal.         Mood and Affect: Mood and affect normal.         Behavior: Behavior normal. Behavior is cooperative.         Thought Content: Thought content normal.         Judgment: Judgment normal.          Vital Signs:  Vital Signs:  /70 (BP Location: Left arm, Patient Position: Sitting, Cuff Size: Adult)    "Pulse 90   Temp 98 °F (36.7 °C) (Infrared)   Resp 16   Ht 165.1 cm (65\")   Wt 68.5 kg (151 lb)   SpO2 98%   BMI 25.13 kg/m²   Vitals:    02/27/23 1557   BP: 106/70   BP Location: Left arm   Patient Position: Sitting   Cuff Size: Adult   Pulse: 90   Resp: 16   Temp: 98 °F (36.7 °C)   TempSrc: Infrared   SpO2: 98%   Weight: 68.5 kg (151 lb)   Height: 165.1 cm (65\")        Estimated body mass index is 25.13 kg/m² as calculated from the following:    Height as of this encounter: 165.1 cm (65\").    Weight as of this encounter: 68.5 kg (151 lb).    Result Review :   The following data was reviewed by: BRYANT Montanez on 02/27/2023:                    UA    Urinalysis 2/27/23   Ketones, UA Negative   Leukocytes, UA Trace (A)   (A) Abnormal value                           PHQ-9 Total Score: 0                 Assessment and Plan   Problems Addressed this Visit        Mental Health    Anxiety - Primary    Relevant Orders    T3, free    T4    Vitamin D 25 hydroxy       Symptoms and Signs    Fatigue    Relevant Orders    POC Urinalysis Dipstick (Completed)    CBC Auto Differential    Comprehensive Metabolic Panel   Other Visit Diagnoses     Abdominal bloating        Relevant Orders    Food Allergy Profile    Transient gluten sensitivity        Screening for lipid disorders        Relevant Orders    Lipid Panel    Screening for diabetes mellitus        Relevant Orders    Hemoglobin A1c    Screening for thyroid disorder        Relevant Orders    TSH    Seasonal allergies        taking zyrtec daily and sudafed as needed.     Right lower quadrant pain        Tenderness to palpation.       Diagnoses       Codes Comments    Anxiety    -  Primary ICD-10-CM: F41.9  ICD-9-CM: 300.00     Abdominal bloating     ICD-10-CM: R14.0  ICD-9-CM: 787.3     Transient gluten sensitivity     ICD-10-CM: K90.0  ICD-9-CM: 995.3     Screening for lipid disorders     ICD-10-CM: Z13.220  ICD-9-CM: V77.91     Fatigue, unspecified type     " ICD-10-CM: R53.83  ICD-9-CM: 780.79     Screening for diabetes mellitus     ICD-10-CM: Z13.1  ICD-9-CM: V77.1     Screening for thyroid disorder     ICD-10-CM: Z13.29  ICD-9-CM: V77.0     Seasonal allergies     ICD-10-CM: J30.2  ICD-9-CM: 477.9 taking zyrtec daily and sudafed as needed.     Right lower quadrant pain     ICD-10-CM: R10.31  ICD-9-CM: 789.03 Tenderness to palpation.           BMI is within normal parameters. No other follow-up for BMI required.      Diagnoses and all orders for this visit:    1. Anxiety (Primary)  -     T3, free  -     T4  -     Vitamin D 25 hydroxy    2. Abdominal bloating  -     Food Allergy Profile    3. Transient gluten sensitivity    4. Screening for lipid disorders  -     Lipid Panel    5. Fatigue, unspecified type  -     POC Urinalysis Dipstick  -     CBC Auto Differential  -     Comprehensive Metabolic Panel    6. Screening for diabetes mellitus  -     Hemoglobin A1c    7. Screening for thyroid disorder  -     TSH    8. Seasonal allergies  Comments:  taking zyrtec daily and sudafed as needed.     9. Right lower quadrant pain  Comments:  Tenderness to palpation.     Other orders  -     buPROPion XL (Wellbutrin XL) 150 MG 24 hr tablet; Take 1 tablet by mouth Daily.  Dispense: 30 tablet; Refill: 3  -     hydrOXYzine (ATARAX) 25 MG tablet; Take 1 tablet by mouth At Night As Needed for Anxiety.  Dispense: 30 tablet; Refill: 2         Follow Up   No follow-ups on file.  Patient was given instructions and counseling regarding her condition or for health maintenance advice. Please see specific information pulled into the AVS if appropriate.    Patient Instructions   Make appt with ob/gyn for well woman exam.     Take certizine 10mg daily, nasacort daily for seasonal allergies.    Begin a food diary.  Lose it tyesha, [employer] tyesha.  Good sleep hygiene          Please have blood work completed.  Orders are at [facility].    Return for annual physical exam.  Continue plan of care as  discussed.     Practice good sleep hygiene.  Eat a well balanced diet with fresh fruit and vegetables.    Drink at least 8 bottles of water or equivalent per day.     Limit sweetened beverages, sodas, juices.    Bake, boil, broil or grill your food, avoid eating fried foods.   Exercise at least 150 minutes per week.          II wore protective equipment throughout this patient encounter to include mask. Hand hygiene was performed during entrance to exam room and following assessment of patient.     This document is intended for medical expert use only. Reading of this document by patients and/or patient's family without participating medical staff guidance may result in misinterpretation and unintended morbidity. Any interpretation of such data is the responsibility of the patient and/or family member responsible for the patient in concert with their primary or specialist providers, not to be left for sources of online searches such as [employer], [employer] or similar queries. Relying on these approaches to knowledge may result in misinterpretation, misguided goals of care and even death should patients or family members try recommendations outside of the realm of professional medical care.

## 2023-03-01 LAB
25(OH)D3+25(OH)D2 SERPL-MCNC: 25.8 NG/ML (ref 30–100)
T3FREE SERPL-MCNC: 3.9 PG/ML (ref 2–4.4)
T4 SERPL-MCNC: 9.1 UG/DL (ref 4.5–12)

## 2023-03-04 LAB
ALBUMIN SERPL-MCNC: 4.6 G/DL (ref 3.9–5)
ALBUMIN/GLOB SERPL: 1.5 {RATIO} (ref 1.2–2.2)
ALP SERPL-CCNC: 50 IU/L (ref 44–121)
ALT SERPL-CCNC: 15 IU/L (ref 0–32)
AST SERPL-CCNC: 16 IU/L (ref 0–40)
BASOPHILS # BLD AUTO: 0 X10E3/UL (ref 0–0.2)
BASOPHILS NFR BLD AUTO: 1 %
BILIRUB SERPL-MCNC: 0.6 MG/DL (ref 0–1.2)
BUN SERPL-MCNC: 12 MG/DL (ref 6–20)
BUN/CREAT SERPL: 15 (ref 9–23)
CALCIUM SERPL-MCNC: 9.4 MG/DL (ref 8.7–10.2)
CHLORIDE SERPL-SCNC: 103 MMOL/L (ref 96–106)
CHOLEST SERPL-MCNC: 121 MG/DL (ref 100–199)
CLAM IGE QN: <0.1 KU/L
CO2 SERPL-SCNC: 23 MMOL/L (ref 20–29)
CODFISH IGE QN: <0.1 KU/L
CONV CLASS DESCRIPTION: ABNORMAL
CORN IGE QN: <0.1 KU/L
COW MILK IGE QN: <0.1 KU/L
CREAT SERPL-MCNC: 0.79 MG/DL (ref 0.57–1)
EGFRCR SERPLBLD CKD-EPI 2021: 106 ML/MIN/1.73
EGG WHITE IGE QN: <0.1 KU/L
EOSINOPHIL # BLD AUTO: 0.2 X10E3/UL (ref 0–0.4)
EOSINOPHIL NFR BLD AUTO: 4 %
ERYTHROCYTE [DISTWIDTH] IN BLOOD BY AUTOMATED COUNT: 11.9 % (ref 11.7–15.4)
GLOBULIN SER CALC-MCNC: 3 G/DL (ref 1.5–4.5)
GLUCOSE SERPL-MCNC: 89 MG/DL (ref 70–99)
HBA1C MFR BLD: 5.2 % (ref 4.8–5.6)
HCT VFR BLD AUTO: 39.8 % (ref 34–46.6)
HDLC SERPL-MCNC: 52 MG/DL
HGB BLD-MCNC: 13.7 G/DL (ref 11.1–15.9)
IMM GRANULOCYTES # BLD AUTO: 0 X10E3/UL (ref 0–0.1)
IMM GRANULOCYTES NFR BLD AUTO: 0 %
LDLC SERPL CALC-MCNC: 58 MG/DL (ref 0–99)
LYMPHOCYTES # BLD AUTO: 2.3 X10E3/UL (ref 0.7–3.1)
LYMPHOCYTES NFR BLD AUTO: 37 %
MCH RBC QN AUTO: 31.5 PG (ref 26.6–33)
MCHC RBC AUTO-ENTMCNC: 34.4 G/DL (ref 31.5–35.7)
MCV RBC AUTO: 92 FL (ref 79–97)
MONOCYTES # BLD AUTO: 0.4 X10E3/UL (ref 0.1–0.9)
MONOCYTES NFR BLD AUTO: 6 %
NEUTROPHILS # BLD AUTO: 3.4 X10E3/UL (ref 1.4–7)
NEUTROPHILS NFR BLD AUTO: 52 %
PEANUT IGE QN: <0.1 KU/L
PLATELET # BLD AUTO: 285 X10E3/UL (ref 150–450)
POTASSIUM SERPL-SCNC: 4 MMOL/L (ref 3.5–5.2)
PROT SERPL-MCNC: 7.6 G/DL (ref 6–8.5)
RBC # BLD AUTO: 4.35 X10E6/UL (ref 3.77–5.28)
SCALLOP IGE QN: <0.1 KU/L
SESAME SEED IGE QN: <0.1 KU/L
SHRIMP IGE QN: 0.12 KU/L
SODIUM SERPL-SCNC: 140 MMOL/L (ref 134–144)
SOYBEAN IGE QN: <0.1 KU/L
TRIGL SERPL-MCNC: 47 MG/DL (ref 0–149)
TSH SERPL DL<=0.005 MIU/L-ACNC: 1.45 UIU/ML (ref 0.45–4.5)
VLDLC SERPL CALC-MCNC: 11 MG/DL (ref 5–40)
WALNUT IGE QN: <0.1 KU/L
WBC # BLD AUTO: 6.3 X10E3/UL (ref 3.4–10.8)
WHEAT IGE QN: <0.1 KU/L

## 2023-05-19 ENCOUNTER — OFFICE VISIT (OUTPATIENT)
Dept: FAMILY MEDICINE CLINIC | Facility: CLINIC | Age: 28
End: 2023-05-19
Payer: COMMERCIAL

## 2023-05-19 DIAGNOSIS — Z01.419 WELL WOMAN EXAM WITH ROUTINE GYNECOLOGICAL EXAM: Primary | ICD-10-CM

## 2023-05-19 DIAGNOSIS — E66.3 OVERWEIGHT (BMI 25.0-29.9): ICD-10-CM

## 2023-05-19 DIAGNOSIS — N76.0 BV (BACTERIAL VAGINOSIS): ICD-10-CM

## 2023-05-19 DIAGNOSIS — B96.89 BV (BACTERIAL VAGINOSIS): ICD-10-CM

## 2023-05-19 DIAGNOSIS — N89.8 VAGINAL DISCHARGE: ICD-10-CM

## 2023-05-19 RX ORDER — METRONIDAZOLE 7.5 MG/G
GEL VAGINAL
Qty: 5 APPLICATION | Refills: 1 | Status: SHIPPED | OUTPATIENT
Start: 2023-05-19 | End: 2023-05-24

## 2023-05-19 NOTE — PROGRESS NOTES
Chief Complaint  Gynecologic Exam and Vaginal Discharge    Subjective          Gin is a 27 y.o. female  who presents to [facility]     Patient Care Team:  Quynh Kirkpatrick APRN as PCP - General (Nurse Practitioner)     History of Present Illness  Gin is here today for vaginal discharge    Location: vaginal discharge  Quality:  Thin, clear, watery  Severity: mild to moderate  Duration: for 2-3 weeks  Timing: constant  Context: no new sexual partner  Alleviating factors: nothing makes better  Aggravating factors: nothing makes worse  Associated Symptoms: no abdominal pain, + vaginal itching, + vaginal odor    Last Pap Smear in 2019 - reports it was normal  History of abnormal pap smear ?  No    Periods are every 28-30 days  Periods are regular  Periods last 5-7 days  Flow is moderate  Bleeding between periods: No  Vaginal discharge: Yes  Sexually active: Yes  No birth control.  History STD ? No    On scale of 1-10:  Pain during your usual period: 6   Pain during sex: 0  PMS: 3    Do you feel safe ? Yes      Gin Garcia  has a past medical history of Allergic, Allergic rhinitis, Anisocoria, Anxiety, Chronic constipation (06/24/2021), Dysmenorrhea, and Hypoglycemia.      Review of Systems   Constitutional: Negative for fever.   Respiratory: Negative for cough and shortness of breath.    Cardiovascular: Negative for chest pain and palpitations.   Gastrointestinal: Negative for abdominal pain, nausea and vomiting.   Genitourinary: Positive for vaginal discharge (clear, watery). Negative for dysuria, genital sores, hematuria, menstrual problem, pelvic pain, vaginal bleeding and vaginal pain.   Skin: Negative for rash.        Family History   Problem Relation Age of Onset   • Depression Father    • Brain cancer Maternal Uncle    • Diabetes Maternal Grandmother    • Hypertension Maternal Grandmother    • Diabetes Paternal Grandfather    • Thyroid disease Sister         Past  "Surgical History:   Procedure Laterality Date   •  SECTION  2019        Social History     Socioeconomic History   • Marital status: Single   Tobacco Use   • Smoking status: Never   • Smokeless tobacco: Never   Vaping Use   • Vaping Use: Never used   Substance and Sexual Activity   • Alcohol use: Yes     Comment: Socially   • Drug use: No   • Sexual activity: Yes     Partners: Male     Birth control/protection: None        Immunization History   Administered Date(s) Administered   • DTaP / HiB / IPV 1998, 1998   • DTaP / IPV 1999, 2003   • Flu Vaccine Quad PF >36MO 10/08/2015, 10/04/2018   • H1N1 Inj 2009   • HPV Quadrivalent 10/17/2009, 2009   • HPV, Unspecified 2010   • Hep B, Adolescent or Pediatric 1997, 1998, 1998   • HiB 1998, 1999   • Hib (PRP-T) 10/22/1999   • MMR 2003   • MMRV 10/23/1998   • Meningococcal MCV4P (Menactra) 10/17/2009, 10/28/2014   • PEDS-Pneumococcal Conjugate (PCV7) 2000   • Tdap 10/17/2009, 2016, 2019       Objective       Current Outpatient Medications:   •  buPROPion XL (Wellbutrin XL) 150 MG 24 hr tablet, Take 1 tablet by mouth Daily., Disp: 30 tablet, Rfl: 3  •  cetirizine (zyrTEC) 10 MG tablet, Take 1 tablet by mouth Daily., Disp: , Rfl:   •  hydrOXYzine (ATARAX) 25 MG tablet, Take 1 tablet by mouth At Night As Needed for Anxiety., Disp: 30 tablet, Rfl: 2  •  metroNIDAZOLE (METROGEL) 0.75 % vaginal gel, Insert  into the vagina every night at bedtime for 5 days., Disp: 5 application, Rfl: 1    Vital Signs:      /70   Pulse 97   Temp 98.1 °F (36.7 °C) (Infrared)   Resp 18   Ht 165.1 cm (65\")   Wt 70.3 kg (155 lb)   LMP 2023 (Approximate)   SpO2 97%   BMI 25.79 kg/m²     Vitals:    23 1554   BP: 110/70   Pulse: 97   Resp: 18   Temp: 98.1 °F (36.7 °C)   TempSrc: Infrared   SpO2: 97%   Weight: 70.3 kg (155 lb)   Height: 165.1 cm (65\")      Physical " Exam  Vitals reviewed.   Constitutional:       General: She is not in acute distress.     Appearance: Normal appearance.   HENT:      Head: Normocephalic and atraumatic.      Right Ear: Tympanic membrane, ear canal and external ear normal.      Left Ear: Tympanic membrane, ear canal and external ear normal.      Nose: Nose normal.      Mouth/Throat:      Mouth: Mucous membranes are moist.      Pharynx: Oropharynx is clear.   Eyes:      General: No scleral icterus.     Conjunctiva/sclera: Conjunctivae normal.   Cardiovascular:      Rate and Rhythm: Normal rate and regular rhythm.      Heart sounds: Normal heart sounds.   Pulmonary:      Effort: Pulmonary effort is normal. No respiratory distress.      Breath sounds: Normal breath sounds. No wheezing.   Abdominal:      General: Bowel sounds are normal. There is no distension.      Palpations: Abdomen is soft. There is no mass.      Tenderness: There is no abdominal tenderness. There is no guarding or rebound.   Genitourinary:     General: Normal vulva.      Exam position: Lithotomy position.      Pubic Area: No rash.       Labia:         Right: No rash, tenderness or lesion.         Left: No rash, tenderness or lesion.       Urethra: No urethral lesion.      Vagina: Vaginal discharge (white) present. No erythema or tenderness.      Cervix: No cervical motion tenderness, discharge, friability or erythema.      Uterus: Normal. Not enlarged and not tender.       Adnexa: Right adnexa normal and left adnexa normal.      Comments: Specimen obtained for pap smear.  + whiff test  Musculoskeletal:      Cervical back: Neck supple.      Right lower leg: No edema.      Left lower leg: No edema.   Lymphadenopathy:      Cervical: No cervical adenopathy.   Skin:     General: Skin is warm and dry.   Neurological:      Mental Status: She is alert and oriented to person, place, and time.   Psychiatric:         Mood and Affect: Mood normal.        Result Review :                      Assessment and Plan    Diagnoses and all orders for this visit:    1. Well woman exam with routine gynecological exam (Primary)  Comments:  Pap smear completed.  Orders:  -     IGP, Rfx Aptima HPV ASCU    2. Vaginal discharge  -     Chlamydia trachomatis, Neisseria gonorrhoeae, Trichomonas vaginalis, PCR - ThinPrep Vial,  -     Bacterial Vaginosis, HUI - ThinPrep Vial,    3. BV (bacterial vaginosis)  -     metroNIDAZOLE (METROGEL) 0.75 % vaginal gel; Insert  into the vagina every night at bedtime for 5 days.  Dispense: 5 application; Refill: 1    4. Overweight (BMI 25.0-29.9)  Assessment & Plan:  Patient's (Body mass index is 25.79 kg/m².) indicates that they are overweight with health conditions that include none . Weight is worsening. BMI  is above average; BMI management plan is completed. We discussed portion control and increasing exercise.        Handout on bacterial vaginosis provided on after visit summary.      Follow Up   Return in about 3 months (around 8/19/2023) for Annual physical with your PCP.  Patient was given instructions and counseling regarding her condition or for health maintenance advice. Please see specific information pulled into the AVS if appropriate.    There are no Patient Instructions on file for this visit.

## 2023-05-20 VITALS
HEART RATE: 97 BPM | RESPIRATION RATE: 18 BRPM | HEIGHT: 65 IN | BODY MASS INDEX: 25.83 KG/M2 | TEMPERATURE: 98.1 F | DIASTOLIC BLOOD PRESSURE: 70 MMHG | WEIGHT: 155 LBS | OXYGEN SATURATION: 97 % | SYSTOLIC BLOOD PRESSURE: 110 MMHG

## 2023-05-20 PROBLEM — E66.3 OVERWEIGHT (BMI 25.0-29.9): Status: ACTIVE | Noted: 2023-05-20

## 2023-05-21 NOTE — ASSESSMENT & PLAN NOTE
Patient's (Body mass index is 25.79 kg/m².) indicates that they are overweight with health conditions that include none . Weight is worsening. BMI  is above average; BMI management plan is completed. We discussed portion control and increasing exercise.

## 2023-05-23 LAB
A VAGINAE DNA VAG QL NAA+PROBE: ABNORMAL SCORE
BVAB2 DNA VAG QL NAA+PROBE: ABNORMAL SCORE
C TRACH RRNA SPEC QL NAA+PROBE: NEGATIVE
MEGA1 DNA VAG QL NAA+PROBE: ABNORMAL SCORE
N GONORRHOEA RRNA SPEC QL NAA+PROBE: NEGATIVE
T VAGINALIS RRNA SPEC QL NAA+PROBE: NEGATIVE

## 2023-05-23 NOTE — PROGRESS NOTES
Let her know:    1. Bacterial vaginosis swab is positive for BV.  The metroNIDAZOLE I prescribed at her appointment will treat.    2. Test for sexually transmitted infections Chlamydia, gonorrhoeae and trichomonas are all negative (normal).

## 2023-05-25 LAB
CONV .: NORMAL
CYTOLOGIST CVX/VAG CYTO: NORMAL
CYTOLOGY CVX/VAG DOC CYTO: NORMAL
CYTOLOGY CVX/VAG DOC THIN PREP: NORMAL
DX ICD CODE: NORMAL
HIV 1 & 2 AB SER-IMP: NORMAL
OTHER STN SPEC: NORMAL
STAT OF ADQ CVX/VAG CYTO-IMP: NORMAL

## 2024-09-11 ENCOUNTER — TELEPHONE (OUTPATIENT)
Dept: FAMILY MEDICINE CLINIC | Facility: CLINIC | Age: 29
End: 2024-09-11
Payer: COMMERCIAL
